# Patient Record
Sex: FEMALE | Race: WHITE | URBAN - METROPOLITAN AREA
[De-identification: names, ages, dates, MRNs, and addresses within clinical notes are randomized per-mention and may not be internally consistent; named-entity substitution may affect disease eponyms.]

---

## 2017-09-06 ENCOUNTER — HOSPITAL ENCOUNTER (OUTPATIENT)
Dept: MAMMOGRAPHY | Age: 67
Discharge: HOME | End: 2017-09-06
Attending: INTERNAL MEDICINE | Admitting: INTERNAL MEDICINE

## 2017-09-06 DIAGNOSIS — Z12.31 ENCOUNTER FOR SCREENING MAMMOGRAM FOR BREAST CANCER: ICD-10-CM

## 2018-09-14 ENCOUNTER — HOSPITAL ENCOUNTER (EMERGENCY)
Age: 68
Discharge: HOME | End: 2018-09-14
Attending: EMERGENCY MEDICINE
Payer: MEDICARE

## 2018-09-14 ENCOUNTER — APPOINTMENT (OUTPATIENT)
Dept: CT IMAGING | Age: 68
End: 2018-09-14
Payer: MEDICARE

## 2018-09-14 VITALS
WEIGHT: 149 LBS | SYSTOLIC BLOOD PRESSURE: 155 MMHG | OXYGEN SATURATION: 100 % | RESPIRATION RATE: 14 BRPM | TEMPERATURE: 98.2 F | HEART RATE: 80 BPM | DIASTOLIC BLOOD PRESSURE: 72 MMHG | BODY MASS INDEX: 25.58 KG/M2

## 2018-09-14 DIAGNOSIS — N23 RENAL COLIC: ICD-10-CM

## 2018-09-14 DIAGNOSIS — R10.9 RIGHT FLANK PAIN: Primary | ICD-10-CM

## 2018-09-14 LAB
A/G RATIO: 2.2 (ref 1.1–2.2)
ALBUMIN SERPL-MCNC: 4.4 G/DL (ref 3.4–5)
ALP BLD-CCNC: 66 U/L (ref 40–129)
ALT SERPL-CCNC: 13 U/L (ref 10–40)
ANION GAP SERPL CALCULATED.3IONS-SCNC: 11 MMOL/L (ref 3–16)
AST SERPL-CCNC: 17 U/L (ref 15–37)
BACTERIA: ABNORMAL /HPF
BASOPHILS ABSOLUTE: 0 K/UL (ref 0–0.2)
BASOPHILS RELATIVE PERCENT: 0.2 %
BILIRUB SERPL-MCNC: 0.6 MG/DL (ref 0–1)
BILIRUBIN URINE: NEGATIVE
BLOOD, URINE: ABNORMAL
BUN BLDV-MCNC: 18 MG/DL (ref 7–20)
CALCIUM SERPL-MCNC: 9.1 MG/DL (ref 8.3–10.6)
CHLORIDE BLD-SCNC: 107 MMOL/L (ref 99–110)
CLARITY: CLEAR
CO2: 23 MMOL/L (ref 21–32)
COLOR: YELLOW
CREAT SERPL-MCNC: 0.6 MG/DL (ref 0.6–1.2)
EOSINOPHILS ABSOLUTE: 0.4 K/UL (ref 0–0.6)
EOSINOPHILS RELATIVE PERCENT: 5.2 %
EPITHELIAL CELLS, UA: ABNORMAL /HPF
GFR AFRICAN AMERICAN: >60
GFR NON-AFRICAN AMERICAN: >60
GLOBULIN: 2 G/DL
GLUCOSE BLD-MCNC: 98 MG/DL (ref 70–99)
GLUCOSE URINE: NEGATIVE MG/DL
HCT VFR BLD CALC: 41.6 % (ref 36–48)
HEMOGLOBIN: 14.5 G/DL (ref 12–16)
KETONES, URINE: ABNORMAL MG/DL
LEUKOCYTE ESTERASE, URINE: NEGATIVE
LYMPHOCYTES ABSOLUTE: 2.5 K/UL (ref 1–5.1)
LYMPHOCYTES RELATIVE PERCENT: 35.2 %
MCH RBC QN AUTO: 34.7 PG (ref 26–34)
MCHC RBC AUTO-ENTMCNC: 34.9 G/DL (ref 31–36)
MCV RBC AUTO: 99.3 FL (ref 80–100)
MICROSCOPIC EXAMINATION: YES
MONOCYTES ABSOLUTE: 0.5 K/UL (ref 0–1.3)
MONOCYTES RELATIVE PERCENT: 7.7 %
NEUTROPHILS ABSOLUTE: 3.6 K/UL (ref 1.7–7.7)
NEUTROPHILS RELATIVE PERCENT: 51.7 %
NITRITE, URINE: NEGATIVE
PDW BLD-RTO: 13 % (ref 12.4–15.4)
PH UA: 5.5
PLATELET # BLD: 222 K/UL (ref 135–450)
PMV BLD AUTO: 8.3 FL (ref 5–10.5)
POTASSIUM SERPL-SCNC: 4.4 MMOL/L (ref 3.5–5.1)
PROTEIN UA: NEGATIVE MG/DL
RBC # BLD: 4.19 M/UL (ref 4–5.2)
RBC UA: ABNORMAL /HPF (ref 0–2)
SODIUM BLD-SCNC: 141 MMOL/L (ref 136–145)
SPECIFIC GRAVITY UA: >=1.03
TOTAL PROTEIN: 6.4 G/DL (ref 6.4–8.2)
URINE TYPE: ABNORMAL
UROBILINOGEN, URINE: 0.2 E.U./DL
WBC # BLD: 7.1 K/UL (ref 4–11)
WBC UA: ABNORMAL /HPF (ref 0–5)

## 2018-09-14 PROCEDURE — 99284 EMERGENCY DEPT VISIT MOD MDM: CPT

## 2018-09-14 PROCEDURE — 74176 CT ABD & PELVIS W/O CONTRAST: CPT

## 2018-09-14 PROCEDURE — 96375 TX/PRO/DX INJ NEW DRUG ADDON: CPT

## 2018-09-14 PROCEDURE — 85025 COMPLETE CBC W/AUTO DIFF WBC: CPT

## 2018-09-14 PROCEDURE — 96374 THER/PROPH/DIAG INJ IV PUSH: CPT

## 2018-09-14 PROCEDURE — 6360000002 HC RX W HCPCS: Performed by: EMERGENCY MEDICINE

## 2018-09-14 PROCEDURE — 80053 COMPREHEN METABOLIC PANEL: CPT

## 2018-09-14 PROCEDURE — 81001 URINALYSIS AUTO W/SCOPE: CPT

## 2018-09-14 RX ORDER — OXYCODONE HYDROCHLORIDE AND ACETAMINOPHEN 5; 325 MG/1; MG/1
1-2 TABLET ORAL EVERY 4 HOURS PRN
Qty: 15 TABLET | Refills: 0 | Status: SHIPPED | OUTPATIENT
Start: 2018-09-14 | End: 2018-09-21

## 2018-09-14 RX ORDER — ATORVASTATIN CALCIUM 40 MG/1
40 TABLET, FILM COATED ORAL DAILY
COMMUNITY

## 2018-09-14 RX ORDER — MORPHINE SULFATE 4 MG/ML
INJECTION, SOLUTION INTRAMUSCULAR; INTRAVENOUS
Status: DISCONTINUED
Start: 2018-09-14 | End: 2018-09-14 | Stop reason: HOSPADM

## 2018-09-14 RX ORDER — ROPINIROLE 0.5 MG/1
0.5 TABLET, FILM COATED ORAL 3 TIMES DAILY
COMMUNITY

## 2018-09-14 RX ORDER — KETOROLAC TROMETHAMINE 30 MG/ML
30 INJECTION, SOLUTION INTRAMUSCULAR; INTRAVENOUS ONCE
Status: COMPLETED | OUTPATIENT
Start: 2018-09-14 | End: 2018-09-14

## 2018-09-14 RX ORDER — ONDANSETRON 2 MG/ML
4 INJECTION INTRAMUSCULAR; INTRAVENOUS EVERY 30 MIN PRN
Status: DISCONTINUED | OUTPATIENT
Start: 2018-09-14 | End: 2018-09-14 | Stop reason: HOSPADM

## 2018-09-14 RX ORDER — MORPHINE SULFATE 4 MG/ML
4 INJECTION, SOLUTION INTRAMUSCULAR; INTRAVENOUS EVERY 30 MIN PRN
Status: DISCONTINUED | OUTPATIENT
Start: 2018-09-14 | End: 2018-09-14 | Stop reason: HOSPADM

## 2018-09-14 RX ADMIN — ONDANSETRON 4 MG: 2 INJECTION INTRAMUSCULAR; INTRAVENOUS at 07:52

## 2018-09-14 RX ADMIN — MORPHINE SULFATE 4 MG: 4 INJECTION, SOLUTION INTRAMUSCULAR; INTRAVENOUS at 08:48

## 2018-09-14 RX ADMIN — KETOROLAC TROMETHAMINE 30 MG: 30 INJECTION, SOLUTION INTRAMUSCULAR at 07:52

## 2018-09-14 ASSESSMENT — ENCOUNTER SYMPTOMS
ABDOMINAL DISTENTION: 0
NAUSEA: 1
DIARRHEA: 0
VOMITING: 1
ABDOMINAL PAIN: 0
SHORTNESS OF BREATH: 0
BACK PAIN: 1

## 2018-09-14 ASSESSMENT — PAIN DESCRIPTION - LOCATION: LOCATION: FLANK

## 2018-09-14 ASSESSMENT — PAIN SCALES - GENERAL
PAINLEVEL_OUTOF10: 1
PAINLEVEL_OUTOF10: 8
PAINLEVEL_OUTOF10: 7
PAINLEVEL_OUTOF10: 9

## 2018-09-14 NOTE — ED PROVIDER NOTES
51.7 %    Lymphocytes % 35.2 %    Monocytes % 7.7 %    Eosinophils % 5.2 %    Basophils % 0.2 %    Neutrophils # 3.6 1.7 - 7.7 K/uL    Lymphocytes # 2.5 1.0 - 5.1 K/uL    Monocytes # 0.5 0.0 - 1.3 K/uL    Eosinophils # 0.4 0.0 - 0.6 K/uL    Basophils # 0.0 0.0 - 0.2 K/uL   Comprehensive metabolic panel   Result Value Ref Range    Sodium 141 136 - 145 mmol/L    Potassium 4.4 3.5 - 5.1 mmol/L    Chloride 107 99 - 110 mmol/L    CO2 23 21 - 32 mmol/L    Anion Gap 11 3 - 16    Glucose 98 70 - 99 mg/dL    BUN 18 7 - 20 mg/dL    CREATININE 0.6 0.6 - 1.2 mg/dL    GFR Non-African American >60 >60    GFR African American >60 >60    Calcium 9.1 8.3 - 10.6 mg/dL    Total Protein 6.4 6.4 - 8.2 g/dL    Alb 4.4 3.4 - 5.0 g/dL    Albumin/Globulin Ratio 2.2 1.1 - 2.2    Total Bilirubin 0.6 0.0 - 1.0 mg/dL    Alkaline Phosphatase 66 40 - 129 U/L    ALT 13 10 - 40 U/L    AST 17 15 - 37 U/L    Globulin 2.0 g/dL   Urinalysis, reflex to microscopic   Result Value Ref Range    Color, UA Yellow Straw/Yellow    Clarity, UA Clear Clear    Glucose, Ur Negative Negative mg/dL    Bilirubin Urine Negative Negative    Ketones, Urine TRACE (A) Negative mg/dL    Specific Gravity, UA >=1.030 1.005 - 1.030    Blood, Urine MODERATE (A) Negative    pH, UA 5.5 5.0 - 8.0    Protein, UA Negative Negative mg/dL    Urobilinogen, Urine 0.2 <2.0 E.U./dL    Nitrite, Urine Negative Negative    Leukocyte Esterase, Urine Negative Negative    Microscopic Examination YES     Urine Type Not Specified    Microscopic Urinalysis   Result Value Ref Range    WBC, UA 3-5 0 - 5 /HPF    RBC, UA 3-5 (A) 0 - 2 /HPF    Epi Cells 20-50 /HPF    Bacteria, UA Rare (A) /HPF       I estimate there is LOW risk for ABDOMINAL AORTIC ANEURYSM, CAUDA EQUINA SYNDROME, EPIDURAL MASS LESION, SPINAL STENOSIS, OR HERNIATED DISK CAUSING SEVERE STENOSIS, thus I consider the discharge disposition reasonable.  Carter Hayward and I have discussed the diagnosis and risks, and we agree with discharging home to follow-up with their primary doctor. We also discussed returning to the Emergency Department immediately if new or worsening symptoms occur. We have discussed the symptoms which are most concerning (e.g., saddle anesthesia, urinary or bowel incontinence or retention, changing or worsening pain) that necessitate immediate return. Final Impression    1. Right flank pain    2. Renal colic        Blood pressure (!) 155/72, pulse 80, temperature 98.2 °F (36.8 °C), resp. rate 14, weight 149 lb (67.6 kg), SpO2 100 %. Radiology  Ct Abdomen Pelvis Wo Contrast    Result Date: 9/14/2018  EXAMINATION: CT OF THE ABDOMEN AND PELVIS WITHOUT CONTRAST 9/14/2018 8:14 am TECHNIQUE: CT of the abdomen and pelvis was performed without the administration of intravenous contrast. Multiplanar reformatted images are provided for review. Dose modulation, iterative reconstruction, and/or weight based adjustment of the mA/kV was utilized to reduce the radiation dose to as low as reasonably achievable. COMPARISON: 02/08/2016 HISTORY: ORDERING SYSTEM PROVIDED HISTORY: right flank pain TECHNOLOGIST PROVIDED HISTORY: Ordering Physician Provided Reason for Exam: right flank pain Acuity: Acute Type of Exam: Initial Additional signs and symptoms:  hematuria, for a day Relevant Medical/Surgical History: c section, apendix removed, history of stones FINDINGS: Lower Chest: There is no consolidation or effusion. Linear scarring is present within the bilateral costophrenic angles. Organs: There is a nonobstructing 4 mm calculus within the right intrarenal collecting system. A nonobstructing 1 mm calculus is present within the left intrarenal collecting system. There is no ureteral calculus or hydronephrosis. The remainder of the solid abdominal organs are unremarkable. GI/Bowel: There is no bowel dilatation, wall thickening or obstruction. Pelvis: Postsurgical changes of hysterectomy are present.   The bladder is decompressed

## 2021-03-05 ENCOUNTER — IMMUNIZATION (OUTPATIENT)
Dept: PRIMARY CARE CLINIC | Age: 71
End: 2021-03-05
Payer: MEDICARE

## 2021-03-05 PROCEDURE — 0011A COVID-19, MODERNA VACCINE 100MCG/0.5ML DOSE: CPT | Performed by: FAMILY MEDICINE

## 2021-03-05 PROCEDURE — 91301 COVID-19, MODERNA VACCINE 100MCG/0.5ML DOSE: CPT | Performed by: FAMILY MEDICINE

## 2021-04-15 ENCOUNTER — IMMUNIZATION (OUTPATIENT)
Dept: PRIMARY CARE CLINIC | Age: 71
End: 2021-04-15
Payer: MEDICARE

## 2021-04-15 PROCEDURE — 91301 COVID-19, MODERNA VACCINE 100MCG/0.5ML DOSE: CPT | Performed by: FAMILY MEDICINE

## 2021-04-15 PROCEDURE — 0012A COVID-19, MODERNA VACCINE 100MCG/0.5ML DOSE: CPT | Performed by: FAMILY MEDICINE

## 2022-01-11 ENCOUNTER — APPOINTMENT (OUTPATIENT)
Dept: GENERAL RADIOLOGY | Age: 72
End: 2022-01-11
Payer: MEDICARE

## 2022-01-11 ENCOUNTER — HOSPITAL ENCOUNTER (EMERGENCY)
Age: 72
Discharge: HOME | End: 2022-01-11
Payer: MEDICARE

## 2022-01-11 VITALS
TEMPERATURE: 97.8 F | HEIGHT: 63 IN | RESPIRATION RATE: 16 BRPM | SYSTOLIC BLOOD PRESSURE: 160 MMHG | DIASTOLIC BLOOD PRESSURE: 85 MMHG | OXYGEN SATURATION: 98 % | BODY MASS INDEX: 28.35 KG/M2 | WEIGHT: 160 LBS | HEART RATE: 88 BPM

## 2022-01-11 DIAGNOSIS — S62.102A CLOSED FRACTURE OF LEFT WRIST, INITIAL ENCOUNTER: Primary | ICD-10-CM

## 2022-01-11 PROCEDURE — 29125 APPL SHORT ARM SPLINT STATIC: CPT

## 2022-01-11 PROCEDURE — 99283 EMERGENCY DEPT VISIT LOW MDM: CPT

## 2022-01-11 PROCEDURE — 73110 X-RAY EXAM OF WRIST: CPT

## 2022-01-11 ASSESSMENT — PAIN SCALES - GENERAL: PAINLEVEL_OUTOF10: 8

## 2022-01-12 NOTE — ED NOTES
Pt placed in 3in Ortho glass Volar splint to left wrist/medium sling applied left arm for comfort. Pt instructed on at home care for injury.      Martha Bautista LPN  51/00/89 [id number]

## 2022-01-12 NOTE — ED NOTES
Pt instructed to follow up with [facility]. Assessed per Cristina HINOJOSA.      Elio Malhotra LPN  76/56/82 [id number]

## 2022-01-12 NOTE — ED PROVIDER NOTES
[address] [facility]  ED  eMERGENCY dEPARTMENT eNCOUnter        Pt Name: Riya Garcia  MRN: [id number]  [place] 1950  Date of evaluation: 1/11/2022  Provider: Vasu Wilkinson PA-C  PCP: Maria Elena Rodriguez MD  ED Attending: Lucio Fenton MD    Patient was not seen by the ED attending  History is provided by the patient    CHIEF COMPLAINT:  Wrist Pain (left wrist) and Fall (tripped over a box)      HISTORY OF PRESENT ILLNESS:  Riya Garcia is a 80 y.o. female who presents to the ED via private vehicle with complaints of left wrist injury. At approximately 4 PM today the patient describes tripping, falling and landing on an outstretched left hand leaving her with left wrist pain. She states she was packing her camper as they are planning to drive to [facility] tomorrow. There was a box behind her. She turned around and tripped over the box, causing her to fall. She has pain and swelling to the distal radius and ulna with associated pain that she rates 8/10. Patient reports she writes with her left hand but does everything else with her right hand. She maintains good mobility of the left wrist despite her injury. No other injury sustained during the fall. No other complaints, modifying factors or associated symptoms. Nursing notes reviewed.    Past Medical History:   Diagnosis Date    Arthritis     Wears glasses      Past Surgical History:   Procedure Laterality Date    APPENDECTOMY      FOOT FRACTURE SURGERY  2009    right foot    HYSTERECTOMY      SHOULDER ARTHROSCOPY Left 1/17/2014    rotator cuff repair    TONSILLECTOMY       Family History   Problem Relation Age of Onset    Heart Disease Mother         CABG     Social History     Socioeconomic History    Marital status:      Spouse name: Not on file    Number of children: Not on file    Years of education: Not on file    Highest education level: Not on file   Occupational History    Not on file   Tobacco Use    Smoking status: Former Smoker     Packs/day: 0.50     Types: Cigarettes     Quit date: 2004     Years since quittin.0    Smokeless tobacco: Never Used   Substance and Sexual Activity    Alcohol use: Yes     Comment: 2 drinks per month    Drug use: No    Sexual activity: Not on file   Other Topics Concern    Not on file   Social History Narrative    Not on file     Social Determinants of Health     Financial Resource Strain:     Difficulty of Paying Living Expenses: Not on file   Food Insecurity:     Worried About Running Out of Food in the Last Year: Not on file    Prakash of Food in the Last Year: Not on file   Transportation Needs:     Lack of Transportation (Medical): Not on file    Lack of Transportation (Non-Medical): Not on file   Physical Activity:     Days of Exercise per Week: Not on file    Minutes of Exercise per Session: Not on file   Stress:     Feeling of Stress : Not on file   Social Connections:     Frequency of Communication with Friends and Family: Not on file    Frequency of Social Gatherings with Friends and Family: Not on file    Attends Hoahaoism Services: Not on file    Active Member of [address] [place] [address] or Organizations: Not on file    Attends Club or Organization Meetings: Not on file    Marital Status: Not on file   Intimate Partner Violence:     Fear of Current or Ex-Partner: Not on file    Emotionally Abused: Not on file    Physically Abused: Not on file    Sexually Abused: Not on file   Housing Stability:     Unable to Pay for Housing in the Last Year: Not on file    Number of [place] in the Last Year: Not on file    Unstable Housing in the Last Year: Not on file     No current facility-administered medications for this encounter.      Current Outpatient Medications   Medication Sig Dispense Refill    rOPINIRole (REQUIP) 0.5 MG tablet Take 0.5 mg by mouth 3 times daily      atorvastatin (LIPITOR) 40 MG tablet Take 40 mg by mouth daily      tamsulosin (FLOMAX) 0.4 MG capsule Take 1 capsule by mouth daily 14 capsule 0     No Known Allergies    REVIEW OF SYSTEMS:  6 systems reviewed, pertinent positives per HPI otherwise noted to be negative. PHYSICAL EXAM:  BP (!) 160/85   Pulse 88   Temp 97.8 °F (36.6 °C)   Resp 16   Ht 5' 3\" (1.6 m)   Wt 160 lb (72.6 kg)   SpO2 98%   BMI 28.34 kg/m²   CONSTITUTIONAL: Awake and alert. Well-developed. Well-nourished. Non-toxic. Cooperative. No acute distress. HENT: Normocephalic. Atraumatic. External ears normal, without discharge. Nose normal. Mucous membranes moist.  EYES: Conjunctiva non-injected. No scleral icterus. PERRL. EOM's grossly intact. NECK: Supple. Normal ROM. CARDIOVASCULAR: Normal heart rate. Intact distal pulses. PULMONARY/CHEST WALL: Breathing is unlabored. Equal, symmetric chest rise. Speaking comfortably in full sentences. ABDOMEN: Nondistended  MUSKULOSKELETAL: Left upper extremity: No acute deformity. Swelling of the distal radius/ulna. Associated tenderness to palpation of distal radius and ulna. No crepitus. Normal ROM. Good, easily palpable radial pulse. Brisk capillary refill. SKIN: Warm and dry. NEUROLOGICAL: Alert and oriented x 3. Strength is 5/5 in all extremities and sensation is intact. PSYCHIATRIC: Normal affect    Labs:    None    RADIOLOGY:    All x-ray studies are viewed/reviewed by me. Formal interpretations per the radiologist are as follows:      XR WRIST LEFT (MIN 3 VIEWS)    Result Date: 1/11/2022  EXAMINATION: 3 XRAY VIEWS OF THE LEFT WRIST 1/11/2022 6:50 pm COMPARISON: None. HISTORY: ORDERING SYSTEM PROVIDED HISTORY: pain due to fall TECHNOLOGIST PROVIDED HISTORY: Reason for exam:->pain due to fall Reason for Exam: pain due to fall, left wrist pain FINDINGS: Nondisplaced fracture ulnar styloid. Nondisplaced fracture distal radial metaphysis. No dislocation. Osteoarthritic changes 1st CMC joint.      Nondisplaced fracture distal radial metaphysis Nondisplaced fracture ulnar styloid       PROCEDURE:   Definitive fracture care: left wrist fracture:  1. Volar splint applied  2. Good alignment of fracture maintained/achieved  3. Patient remained neurovascularly intact  4. The patient is comfortable and all questions are answered  5. Orthopedic referral for follow up within 7 days is provided      ED COURSE/MDM:  Patient was given the following medications:    I have evaluated this patient here in the ED. This patient suffered a mechanical fall at 4 PM.  She landed on outstretched left hand and has left wrist pain. Exam reveals pain and swelling to the distal radius and ulna. X-ray of the left wrist shows nondisplaced fracture of the distal radial metaphyses and nondisplaced fracture of the ulnar styloid. Based on findings, patient placed in volar splint and provided with sling. She is given instructions to ice and elevate. She has established orthopedic care with Dr. Bin Huertas. I recommended she make an appointment to follow-up with him in the next 1 week. In the meantime, she plans to take over-the-counter analgesics for pain. She declines any prescription medication. I estimate there is LOW risk for COMPARTMENT SYNDROME, DEEP VENOUS THROMBOSIS, SEPTIC ARTHRITIS, TENDON OR NEUROVASCULAR INJURY, thus I consider the discharge disposition reasonable. Ava Weir and I have discussed the diagnosis and risks, and we agree with discharging home to follow-up with their primary doctor or the referral orthopedist. We also discussed returning to the Emergency Department immediately if new or worsening symptoms occur. We have discussed the symptoms which are most concerning (e.g., changing or worsening pain, numbness, weakness) that necessitate immediate return. CLINICAL IMPRESSION:  1. Closed fracture of left wrist, initial encounter        Blood pressure (!) 160/85, pulse 88, temperature 97.8 °F (36.6 °C), resp.  rate 16, height 5' 3\" (1.6 m), weight 160 lb (72.6 kg), SpO2 98 %. PATIENT REFERRED TO:  Swathi Portillo MD  [address] [place] [address] [id number] . [facility]  [phone]    Schedule an appointment as soon as possible for a visit           DISPOSITION  Patient was discharged to home in good condition.           Gina [place], [place]  01/11/22 1956

## 2022-01-26 ENCOUNTER — OFFICE VISIT (OUTPATIENT)
Dept: ORTHOPEDIC SURGERY | Age: 72
End: 2022-01-26
Payer: MEDICARE

## 2022-01-26 VITALS — HEIGHT: 63 IN | WEIGHT: 160 LBS | BODY MASS INDEX: 28.35 KG/M2 | RESPIRATION RATE: 16 BRPM

## 2022-01-26 DIAGNOSIS — S52.602A CLOSED FRACTURE DISTAL RADIUS AND ULNA, LEFT, INITIAL ENCOUNTER: Primary | ICD-10-CM

## 2022-01-26 DIAGNOSIS — S52.502A CLOSED FRACTURE DISTAL RADIUS AND ULNA, LEFT, INITIAL ENCOUNTER: Primary | ICD-10-CM

## 2022-01-26 PROCEDURE — G8427 DOCREV CUR MEDS BY ELIG CLIN: HCPCS | Performed by: ORTHOPAEDIC SURGERY

## 2022-01-26 PROCEDURE — 1090F PRES/ABSN URINE INCON ASSESS: CPT | Performed by: ORTHOPAEDIC SURGERY

## 2022-01-26 PROCEDURE — 4040F PNEUMOC VAC/ADMIN/RCVD: CPT | Performed by: ORTHOPAEDIC SURGERY

## 2022-01-26 PROCEDURE — 25600 CLTX DST RDL FX/EPHYS SEP WO: CPT | Performed by: ORTHOPAEDIC SURGERY

## 2022-01-26 PROCEDURE — 99203 OFFICE O/P NEW LOW 30 MIN: CPT | Performed by: ORTHOPAEDIC SURGERY

## 2022-01-26 PROCEDURE — 1036F TOBACCO NON-USER: CPT | Performed by: ORTHOPAEDIC SURGERY

## 2022-01-26 PROCEDURE — 3017F COLORECTAL CA SCREEN DOC REV: CPT | Performed by: ORTHOPAEDIC SURGERY

## 2022-01-26 PROCEDURE — 1123F ACP DISCUSS/DSCN MKR DOCD: CPT | Performed by: ORTHOPAEDIC SURGERY

## 2022-01-26 PROCEDURE — G8400 PT W/DXA NO RESULTS DOC: HCPCS | Performed by: ORTHOPAEDIC SURGERY

## 2022-01-26 PROCEDURE — G8417 CALC BMI ABV UP PARAM F/U: HCPCS | Performed by: ORTHOPAEDIC SURGERY

## 2022-01-26 PROCEDURE — G8484 FLU IMMUNIZE NO ADMIN: HCPCS | Performed by: ORTHOPAEDIC SURGERY

## 2022-01-26 RX ORDER — NAPROXEN 500 MG/1
500 TABLET ORAL 2 TIMES DAILY PRN
COMMUNITY
Start: 2021-10-22

## 2022-01-26 NOTE — PROGRESS NOTES
This 80 y.o.  left hand dominant retired woman is seen in referral for the ED at [facility] with a chief complaint of injury to their left wrist, which was injured 2 weeks ago when she tripped and fell. She noticed pain and swelling. She was evaluated at the [place] were obtained and the patient was splinted and  referred for hand/upper extremity evaluation and treatment. The patient has worn the splint continuously since it was applied. There is no history of additional significant injury. Symptoms have improved since the date of injury. The pain assessment has been reviewed and is correct. The patient's social history, past medical history, family history, medications, allergies and review of systems, entered 1/26/22,  have been reviewed, and dated and are recorded in the chart. On physical examination the patient is Height: 5' 3\" (160 cm) tall and weighs Weight: 160 lb (72.6 kg). Respirations are 18 per minute. The patient is well nourished, is oriented to time and place, demonstrates appropriate mood and affect as well as normal gait and station. There is mild soft tissue swelling present about the left wrist.  There is mild discoloration. There is no deformity. Tenderness is present on palpation in the area of the left wrist  Range of motion of the wrist is limited only on the left and is accompanied by pain. Skin is intact, as is distal circulation and sensation. Gross muscle strength is limited only on the left   Hand and wrist joints are stable. There are no subcutaneous nodules or enlarged epitrochlear lymph nodes. I have personally reviewed and interpreted all previous external imaging studies, laboratory tests(CMP,CBC,urinalysis), diagnostic procedures and medical encounters pertinent to this patient's visit today.     Xrays: Review and independent interpretation of the recent external Xrays of the left wrist demonstrate nondisplaced fractures of the distal radius and ulnar styloid. AP, lateral and oblique Xrays of the left wrist, done in the office today, demonstrate the fractures remain in good position. Impression: Fracture left distal radius and ulnar styloid. The nature of this medical problem is fully discussed with the patient, including all treatment options. All questions are answered. A fiberglass short arm cast is applied, over adequate padding and is well molded for exact fit. The patient is carefully instructed regarding cast care, elevation, exercises, activity restrictions/precautions and pain control. All questions are answered. A return appoinment is made for 2 weeks for  cast removal and X-rays. The patient is asked  to call me sooner if there are any questions or if severe pain or swelling occurs.

## 2022-02-09 ENCOUNTER — OFFICE VISIT (OUTPATIENT)
Dept: ORTHOPEDIC SURGERY | Age: 72
End: 2022-02-09

## 2022-02-09 VITALS — HEIGHT: 63 IN | RESPIRATION RATE: 14 BRPM | WEIGHT: 160 LBS | BODY MASS INDEX: 28.35 KG/M2

## 2022-02-09 DIAGNOSIS — S52.502A CLOSED FRACTURE DISTAL RADIUS AND ULNA, LEFT, INITIAL ENCOUNTER: Primary | ICD-10-CM

## 2022-02-09 DIAGNOSIS — S52.602A CLOSED FRACTURE DISTAL RADIUS AND ULNA, LEFT, INITIAL ENCOUNTER: Primary | ICD-10-CM

## 2022-02-09 PROCEDURE — 99024 POSTOP FOLLOW-UP VISIT: CPT | Performed by: ORTHOPAEDIC SURGERY

## 2022-02-09 NOTE — PROGRESS NOTES
The patient has had no difficulties and voices no complaints. The patient's social history, past medical history, family history, medications, allergies and review of systems have been reviewed, dated 1/26/22 and are recorded in the chart. The short arm cast is removed. Skin is in good condition. There is mild swelling. There is no significant deformity and no tenderness is noted over the fracture site. Range of motion is mildly limited. Xrays: AP, lateral and oblique Xrays of the left wrist, done in the office today, demonstrate progressive healing of the fracture in satisfactory position. The patient is fully advised regarding activities, precautions and a home program of range of motion exercises, which is fully discussed and demonstrated. The patient is given a return appointment for 1 month for range of motion check and is instructed to call sooner if there are any questions or problems.

## 2022-03-09 ENCOUNTER — OFFICE VISIT (OUTPATIENT)
Dept: ORTHOPEDIC SURGERY | Age: 72
End: 2022-03-09

## 2022-03-09 VITALS — HEIGHT: 63 IN | BODY MASS INDEX: 28.35 KG/M2 | WEIGHT: 160 LBS | RESPIRATION RATE: 16 BRPM

## 2022-03-09 DIAGNOSIS — S52.602A CLOSED FRACTURE DISTAL RADIUS AND ULNA, LEFT, INITIAL ENCOUNTER: Primary | ICD-10-CM

## 2022-03-09 DIAGNOSIS — S52.502A CLOSED FRACTURE DISTAL RADIUS AND ULNA, LEFT, INITIAL ENCOUNTER: Primary | ICD-10-CM

## 2022-03-09 PROCEDURE — 99024 POSTOP FOLLOW-UP VISIT: CPT | Performed by: ORTHOPAEDIC SURGERY

## 2022-03-09 NOTE — PROGRESS NOTES
The patient has had no difficulties and voices no complaints. The patient's social history, past medical history, family history, medications, allergies and review of systems have been reviewed, dated 1/26/22 and are recorded in the chart. On physical examination the skin is in good condition. There is lesser swelling. There is minimal deformity and no tenderness is noted over the fracture sites. Range of motion is minimally limited in supination only. The patient is fully advised regarding activities, precautions and additional home range of motion exercises, which are fully discussed and demonstrated. The usual course of events in the resolution of the symptoms associated with this condition is fully discussed with the patient. As long as they progress as expected, they do not need to return for further follow up. They are, however, urged to call or return if they have questions or concerns or if full painless function of their wrist has not returned by 2 weeks from today.

## 2023-05-15 ENCOUNTER — APPOINTMENT (OUTPATIENT)
Dept: GENERAL RADIOLOGY | Age: 73
End: 2023-05-15
Payer: MEDICARE

## 2023-05-15 ENCOUNTER — HOSPITAL ENCOUNTER (EMERGENCY)
Age: 73
Discharge: HOME | End: 2023-05-15
Payer: MEDICARE

## 2023-05-15 VITALS
TEMPERATURE: 98 F | RESPIRATION RATE: 14 BRPM | SYSTOLIC BLOOD PRESSURE: 132 MMHG | HEART RATE: 79 BPM | DIASTOLIC BLOOD PRESSURE: 92 MMHG | OXYGEN SATURATION: 97 %

## 2023-05-15 DIAGNOSIS — M25.531 RIGHT WRIST PAIN: ICD-10-CM

## 2023-05-15 DIAGNOSIS — S52.514A CLOSED NONDISPLACED FRACTURE OF STYLOID PROCESS OF RIGHT RADIUS, INITIAL ENCOUNTER: Primary | ICD-10-CM

## 2023-05-15 PROCEDURE — 99283 EMERGENCY DEPT VISIT LOW MDM: CPT

## 2023-05-15 PROCEDURE — 73130 X-RAY EXAM OF HAND: CPT

## 2023-05-15 PROCEDURE — 73110 X-RAY EXAM OF WRIST: CPT

## 2023-05-15 ASSESSMENT — PAIN SCALES - GENERAL: PAINLEVEL_OUTOF10: 2

## 2023-05-15 ASSESSMENT — PAIN - FUNCTIONAL ASSESSMENT: PAIN_FUNCTIONAL_ASSESSMENT: 0-10

## 2023-05-15 NOTE — ED PROVIDER NOTES
(Please note that portions of this note were completed with a voice recognition program.  Efforts were made to edit the dictations but occasionally words are mis-transcribed.)    MICAELA Chavez (electronically signed)           Cecil German [facility]  05/15/23 [phone]

## 2023-05-15 NOTE — DISCHARGE INSTRUCTIONS
You were seen in the emergency department today for wrist and hand pain after a fall. X-rays did show possible radial styloid avulsion  fracture. We replaced in a removable splint, please wear this at all times especially when up walking around. Follow-up with orthopedics for further evaluation and treatment. You may take ibuprofen or Tylenol for pain as needed. You may remove the splint to use ice for the injury. Also try elevating your hand to help with swelling.

## 2023-05-15 NOTE — ED NOTES
Discharge paperwork given to and reviewed with pt. Pt verbalized understanding and all questions answered. Pt encouraged to return if having worsening symptoms or new symptoms discussed in discharge paperwork. Pt to follow up with Ortho  Pt in NAD, RR even and unlabored.  Pt off unit ambulatory      Shariaudra GuevaraMain Line Health/[facility]  05/15/23 [id number]

## 2023-05-24 ENCOUNTER — OFFICE VISIT (OUTPATIENT)
Dept: ORTHOPEDIC SURGERY | Age: 73
End: 2023-05-24
Payer: MEDICARE

## 2023-05-24 VITALS — BODY MASS INDEX: 28.35 KG/M2 | WEIGHT: 160 LBS | RESPIRATION RATE: 15 BRPM | HEIGHT: 63 IN

## 2023-05-24 DIAGNOSIS — S63.501A SPRAIN OF RIGHT WRIST, INITIAL ENCOUNTER: Primary | ICD-10-CM

## 2023-05-24 PROCEDURE — 3017F COLORECTAL CA SCREEN DOC REV: CPT | Performed by: ORTHOPAEDIC SURGERY

## 2023-05-24 PROCEDURE — G8427 DOCREV CUR MEDS BY ELIG CLIN: HCPCS | Performed by: ORTHOPAEDIC SURGERY

## 2023-05-24 PROCEDURE — 99203 OFFICE O/P NEW LOW 30 MIN: CPT | Performed by: ORTHOPAEDIC SURGERY

## 2023-05-24 PROCEDURE — 1123F ACP DISCUSS/DSCN MKR DOCD: CPT | Performed by: ORTHOPAEDIC SURGERY

## 2023-05-24 PROCEDURE — 1090F PRES/ABSN URINE INCON ASSESS: CPT | Performed by: ORTHOPAEDIC SURGERY

## 2023-05-24 PROCEDURE — G8419 CALC BMI OUT NRM PARAM NOF/U: HCPCS | Performed by: ORTHOPAEDIC SURGERY

## 2023-05-24 PROCEDURE — G8400 PT W/DXA NO RESULTS DOC: HCPCS | Performed by: ORTHOPAEDIC SURGERY

## 2023-05-24 PROCEDURE — 1036F TOBACCO NON-USER: CPT | Performed by: ORTHOPAEDIC SURGERY

## 2023-05-24 NOTE — PROGRESS NOTES
This 71 y.o.  left hand dominant retired woman is seen in referral for the ED at [facility]  with a chief complaint of injury to their right wrist, which was injured 1 week ago when she was pushed of balance by a dog and fell forward onto her bilateral hands. She noticed pain, mild swelling, and no bruising. She was evaluated at the ED the next day. Wrist Xrays were obtained and read as showing degenerative joint changes only. Hand Xrays  were read as showing an old fracture of the little finger and the patient was placed in a wrist splint and referred for hand/upper extremity evaluation and treatment. There is no history of additional significant injury. Symptoms have significantly improved since the date of injury. The pain assessment has been reviewed and is correct. The patient's social history, past medical history, family history, medications, allergies and review of systems, entered 5/24/23,  have been reviewed, and dated and are recorded in the chart. On physical examination the patient is Height: 5' 3\" (160 cm) tall and weighs Weight - Scale: 160 lb (72.6 kg). Respirations are 18 per minute. The patient is well nourished, is oriented to time and place, demonstrates appropriate mood and affect as well as normal gait and station. There are deformities of several digits of both hands consistent with osteoarthritis. There is no soft tissue swelling present about the bilateral wrists, hands. There is no discoloration. Tenderness is not present on palpation in the area of the right wrist, little finger. Range of motion of the hand and wrist is minimally limited bilaterally, due to her arthritis and is not accompanied by pain. Skin is intact, as is distal circulation and sensation. Gross muscle strength is normal bilaterally   Hand and wrist joints are stable. There are no subcutaneous nodules or enlarged epitrochlear lymph nodes.     I have personally reviewed and